# Patient Record
Sex: MALE | Race: WHITE | Employment: OTHER | URBAN - METROPOLITAN AREA
[De-identification: names, ages, dates, MRNs, and addresses within clinical notes are randomized per-mention and may not be internally consistent; named-entity substitution may affect disease eponyms.]

---

## 2019-01-02 ENCOUNTER — HOSPITAL ENCOUNTER (OUTPATIENT)
Age: 79
Discharge: HOME | End: 2019-01-04
Payer: MEDICARE

## 2019-01-02 PROCEDURE — 88304 TISSUE EXAM BY PATHOLOGIST: CPT

## 2019-11-25 ENCOUNTER — HOSPITAL ENCOUNTER (EMERGENCY)
Age: 79
Discharge: HOME | End: 2019-11-25
Attending: EMERGENCY MEDICINE
Payer: MEDICARE

## 2019-11-25 VITALS
SYSTOLIC BLOOD PRESSURE: 119 MMHG | WEIGHT: 175 LBS | BODY MASS INDEX: 27.47 KG/M2 | DIASTOLIC BLOOD PRESSURE: 63 MMHG | HEART RATE: 74 BPM | RESPIRATION RATE: 16 BRPM | HEIGHT: 67 IN | TEMPERATURE: 97.7 F | OXYGEN SATURATION: 97 %

## 2019-11-25 DIAGNOSIS — Z87.19 H/O IRRITABLE BOWEL SYNDROME: ICD-10-CM

## 2019-11-25 DIAGNOSIS — R10.30 LOWER ABDOMINAL PAIN: Primary | ICD-10-CM

## 2019-11-25 DIAGNOSIS — R20.0 NUMBNESS AND TINGLING IN LEFT ARM: ICD-10-CM

## 2019-11-25 DIAGNOSIS — R20.2 NUMBNESS AND TINGLING IN LEFT ARM: ICD-10-CM

## 2019-11-25 LAB
ALBUMIN SERPL-MCNC: 4.4 G/DL (ref 3.5–5.2)
ALP BLD-CCNC: 97 U/L (ref 40–129)
ALT SERPL-CCNC: 12 U/L (ref 0–40)
ANION GAP SERPL CALCULATED.3IONS-SCNC: 12 MMOL/L (ref 7–16)
AST SERPL-CCNC: 20 U/L (ref 0–39)
BILIRUB SERPL-MCNC: 0.4 MG/DL (ref 0–1.2)
BILIRUBIN URINE: NEGATIVE
BLOOD, URINE: NEGATIVE
BUN BLDV-MCNC: 18 MG/DL (ref 8–23)
CALCIUM SERPL-MCNC: 9.3 MG/DL (ref 8.6–10.2)
CHLORIDE BLD-SCNC: 100 MMOL/L (ref 98–107)
CLARITY: CLEAR
CO2: 26 MMOL/L (ref 22–29)
COLOR: YELLOW
CREAT SERPL-MCNC: 1.1 MG/DL (ref 0.7–1.2)
GFR AFRICAN AMERICAN: >60
GFR NON-AFRICAN AMERICAN: >60 ML/MIN/1.73
GLUCOSE BLD-MCNC: 94 MG/DL (ref 74–99)
GLUCOSE URINE: NEGATIVE MG/DL
HCT VFR BLD CALC: 46.8 % (ref 37–54)
HEMOGLOBIN: 15.1 G/DL (ref 12.5–16.5)
KETONES, URINE: NEGATIVE MG/DL
LACTIC ACID: 1.4 MMOL/L (ref 0.5–2.2)
LEUKOCYTE ESTERASE, URINE: NEGATIVE
MCH RBC QN AUTO: 29.1 PG (ref 26–35)
MCHC RBC AUTO-ENTMCNC: 32.3 % (ref 32–34.5)
MCV RBC AUTO: 90.2 FL (ref 80–99.9)
NITRITE, URINE: NEGATIVE
PDW BLD-RTO: 13.7 FL (ref 11.5–15)
PH UA: 6 (ref 5–9)
PLATELET # BLD: 333 E9/L (ref 130–450)
PMV BLD AUTO: 10.1 FL (ref 7–12)
POTASSIUM REFLEX MAGNESIUM: 3.7 MMOL/L (ref 3.5–5)
PROTEIN UA: NEGATIVE MG/DL
RBC # BLD: 5.19 E12/L (ref 3.8–5.8)
SODIUM BLD-SCNC: 138 MMOL/L (ref 132–146)
SPECIFIC GRAVITY UA: <=1.005 (ref 1–1.03)
TOTAL PROTEIN: 7.2 G/DL (ref 6.4–8.3)
TROPONIN: <0.01 NG/ML (ref 0–0.03)
UROBILINOGEN, URINE: 0.2 E.U./DL
WBC # BLD: 11.4 E9/L (ref 4.5–11.5)

## 2019-11-25 PROCEDURE — 80053 COMPREHEN METABOLIC PANEL: CPT

## 2019-11-25 PROCEDURE — 81003 URINALYSIS AUTO W/O SCOPE: CPT

## 2019-11-25 PROCEDURE — 99284 EMERGENCY DEPT VISIT MOD MDM: CPT

## 2019-11-25 PROCEDURE — 83605 ASSAY OF LACTIC ACID: CPT

## 2019-11-25 PROCEDURE — 85027 COMPLETE CBC AUTOMATED: CPT

## 2019-11-25 PROCEDURE — 93005 ELECTROCARDIOGRAM TRACING: CPT | Performed by: EMERGENCY MEDICINE

## 2019-11-25 PROCEDURE — 36415 COLL VENOUS BLD VENIPUNCTURE: CPT

## 2019-11-25 PROCEDURE — 84484 ASSAY OF TROPONIN QUANT: CPT

## 2019-11-25 ASSESSMENT — ENCOUNTER SYMPTOMS
ABDOMINAL PAIN: 1
COUGH: 0
EYE REDNESS: 0
NAUSEA: 0
EYE PAIN: 0
VOMITING: 0
SINUS PRESSURE: 0
DIARRHEA: 1
WHEEZING: 0
SHORTNESS OF BREATH: 0
SORE THROAT: 0
EYE DISCHARGE: 0
BACK PAIN: 0

## 2019-11-25 ASSESSMENT — PAIN SCALES - GENERAL: PAINLEVEL_OUTOF10: 2

## 2019-11-25 ASSESSMENT — PAIN DESCRIPTION - PAIN TYPE: TYPE: ACUTE PAIN

## 2019-11-25 ASSESSMENT — PAIN DESCRIPTION - LOCATION: LOCATION: ABDOMEN

## 2019-11-26 LAB
EKG ATRIAL RATE: 75 BPM
EKG P AXIS: 55 DEGREES
EKG P-R INTERVAL: 134 MS
EKG Q-T INTERVAL: 430 MS
EKG QRS DURATION: 140 MS
EKG QTC CALCULATION (BAZETT): 480 MS
EKG R AXIS: 90 DEGREES
EKG T AXIS: 8 DEGREES
EKG VENTRICULAR RATE: 75 BPM

## 2021-01-31 ENCOUNTER — IMMUNIZATION (OUTPATIENT)
Dept: PRIMARY CARE CLINIC | Age: 81
End: 2021-01-31
Payer: MEDICARE

## 2021-01-31 PROCEDURE — 91300 COVID-19, PFIZER VACCINE 30MCG/0.3ML DOSE: CPT | Performed by: PHYSICIAN ASSISTANT

## 2021-01-31 PROCEDURE — 0001A COVID-19, PFIZER VACCINE 30MCG/0.3ML DOSE: CPT | Performed by: PHYSICIAN ASSISTANT

## 2021-02-25 ENCOUNTER — IMMUNIZATION (OUTPATIENT)
Dept: PRIMARY CARE CLINIC | Age: 81
End: 2021-02-25
Payer: MEDICARE

## 2021-02-25 PROCEDURE — 91300 COVID-19, PFIZER VACCINE 30MCG/0.3ML DOSE: CPT | Performed by: PHYSICIAN ASSISTANT

## 2021-02-25 PROCEDURE — 0002A COVID-19, PFIZER VACCINE 30MCG/0.3ML DOSE: CPT | Performed by: PHYSICIAN ASSISTANT

## 2021-02-26 ENCOUNTER — HOSPITAL ENCOUNTER (OUTPATIENT)
Age: 81
Discharge: HOME | End: 2021-02-28

## 2021-02-26 PROCEDURE — 87081 CULTURE SCREEN ONLY: CPT

## 2021-02-26 PROCEDURE — 88305 TISSUE EXAM BY PATHOLOGIST: CPT

## 2021-02-27 LAB — CLOTEST: NORMAL

## 2022-01-19 ENCOUNTER — HOSPITAL ENCOUNTER (OUTPATIENT)
Age: 82
Discharge: HOME | End: 2022-01-21

## 2022-01-19 PROCEDURE — 87081 CULTURE SCREEN ONLY: CPT

## 2022-01-19 PROCEDURE — 88305 TISSUE EXAM BY PATHOLOGIST: CPT

## 2022-01-20 LAB — CLOTEST: NORMAL

## 2022-02-23 ENCOUNTER — HOSPITAL ENCOUNTER (OUTPATIENT)
Dept: CT IMAGING | Age: 82
Discharge: HOME | End: 2022-02-25
Payer: MEDICARE

## 2022-02-23 ENCOUNTER — ANESTHESIA EVENT (OUTPATIENT)
Dept: CT IMAGING | Age: 82
End: 2022-02-23

## 2022-02-23 ENCOUNTER — ANESTHESIA (OUTPATIENT)
Dept: CT IMAGING | Age: 82
End: 2022-02-23

## 2022-02-23 VITALS
BODY MASS INDEX: 25.9 KG/M2 | HEART RATE: 68 BPM | WEIGHT: 165 LBS | DIASTOLIC BLOOD PRESSURE: 78 MMHG | TEMPERATURE: 98.1 F | HEIGHT: 67 IN | OXYGEN SATURATION: 98 % | SYSTOLIC BLOOD PRESSURE: 129 MMHG | RESPIRATION RATE: 20 BRPM

## 2022-02-23 VITALS
SYSTOLIC BLOOD PRESSURE: 100 MMHG | RESPIRATION RATE: 4 BRPM | OXYGEN SATURATION: 98 % | DIASTOLIC BLOOD PRESSURE: 51 MMHG

## 2022-02-23 DIAGNOSIS — R59.1 LYMPHADENOPATHY: ICD-10-CM

## 2022-02-23 LAB
ANION GAP SERPL CALCULATED.3IONS-SCNC: 11 MMOL/L (ref 7–16)
BASOPHILS ABSOLUTE: 0.1 E9/L (ref 0–0.2)
BASOPHILS RELATIVE PERCENT: 1.3 % (ref 0–2)
BUN BLDV-MCNC: 17 MG/DL (ref 6–23)
CALCIUM SERPL-MCNC: 9.4 MG/DL (ref 8.6–10.2)
CHLORIDE BLD-SCNC: 104 MMOL/L (ref 98–107)
CO2: 25 MMOL/L (ref 22–29)
CREAT SERPL-MCNC: 0.9 MG/DL (ref 0.7–1.2)
EOSINOPHILS ABSOLUTE: 0.19 E9/L (ref 0.05–0.5)
EOSINOPHILS RELATIVE PERCENT: 2.4 % (ref 0–6)
GFR AFRICAN AMERICAN: >60
GFR NON-AFRICAN AMERICAN: >60 ML/MIN/1.73
GLUCOSE BLD-MCNC: 93 MG/DL (ref 74–99)
HCT VFR BLD CALC: 50.1 % (ref 37–54)
HEMOGLOBIN: 16.8 G/DL (ref 12.5–16.5)
IMMATURE GRANULOCYTES #: 0.01 E9/L
IMMATURE GRANULOCYTES %: 0.1 % (ref 0–5)
INR BLD: 1.2
LYMPHOCYTES ABSOLUTE: 1.59 E9/L (ref 1.5–4)
LYMPHOCYTES RELATIVE PERCENT: 20.2 % (ref 20–42)
MCH RBC QN AUTO: 31.2 PG (ref 26–35)
MCHC RBC AUTO-ENTMCNC: 33.5 % (ref 32–34.5)
MCV RBC AUTO: 92.9 FL (ref 80–99.9)
MONOCYTES ABSOLUTE: 0.58 E9/L (ref 0.1–0.95)
MONOCYTES RELATIVE PERCENT: 7.4 % (ref 2–12)
NEUTROPHILS ABSOLUTE: 5.4 E9/L (ref 1.8–7.3)
NEUTROPHILS RELATIVE PERCENT: 68.6 % (ref 43–80)
PDW BLD-RTO: 13 FL (ref 11.5–15)
PLATELET # BLD: 272 E9/L (ref 130–450)
PMV BLD AUTO: 9.9 FL (ref 7–12)
POTASSIUM SERPL-SCNC: 3.9 MMOL/L (ref 3.5–5)
PROTHROMBIN TIME: 12.7 SEC (ref 9.3–12.4)
RBC # BLD: 5.39 E12/L (ref 3.8–5.8)
SODIUM BLD-SCNC: 140 MMOL/L (ref 132–146)
WBC # BLD: 7.9 E9/L (ref 4.5–11.5)

## 2022-02-23 PROCEDURE — 3700000000 HC ANESTHESIA ATTENDED CARE

## 2022-02-23 PROCEDURE — 6360000004 HC RX CONTRAST MEDICATION: Performed by: RADIOLOGY

## 2022-02-23 PROCEDURE — 49180 BIOPSY ABDOMINAL MASS: CPT | Performed by: RADIOLOGY

## 2022-02-23 PROCEDURE — 85025 COMPLETE CBC W/AUTO DIFF WBC: CPT

## 2022-02-23 PROCEDURE — 88184 FLOWCYTOMETRY/ TC 1 MARKER: CPT

## 2022-02-23 PROCEDURE — 2709999900 CT GUIDED NEEDLE PLACEMENT

## 2022-02-23 PROCEDURE — 88342 IMHCHEM/IMCYTCHM 1ST ANTB: CPT

## 2022-02-23 PROCEDURE — 88360 TUMOR IMMUNOHISTOCHEM/MANUAL: CPT

## 2022-02-23 PROCEDURE — 36415 COLL VENOUS BLD VENIPUNCTURE: CPT

## 2022-02-23 PROCEDURE — 3700000001 HC ADD 15 MINUTES (ANESTHESIA)

## 2022-02-23 PROCEDURE — 88185 FLOWCYTOMETRY/TC ADD-ON: CPT

## 2022-02-23 PROCEDURE — 85610 PROTHROMBIN TIME: CPT

## 2022-02-23 PROCEDURE — 2580000003 HC RX 258

## 2022-02-23 PROCEDURE — 77012 CT SCAN FOR NEEDLE BIOPSY: CPT | Performed by: RADIOLOGY

## 2022-02-23 PROCEDURE — 36591 DRAW BLOOD OFF VENOUS DEVICE: CPT

## 2022-02-23 PROCEDURE — 88341 IMHCHEM/IMCYTCHM EA ADD ANTB: CPT

## 2022-02-23 PROCEDURE — 7100000010 HC PHASE II RECOVERY - FIRST 15 MIN

## 2022-02-23 PROCEDURE — 88305 TISSUE EXAM BY PATHOLOGIST: CPT

## 2022-02-23 PROCEDURE — 7100000011 HC PHASE II RECOVERY - ADDTL 15 MIN

## 2022-02-23 PROCEDURE — 80048 BASIC METABOLIC PNL TOTAL CA: CPT

## 2022-02-23 PROCEDURE — 2500000003 HC RX 250 WO HCPCS: Performed by: RADIOLOGY

## 2022-02-23 PROCEDURE — 6360000002 HC RX W HCPCS

## 2022-02-23 PROCEDURE — 49180 BIOPSY ABDOMINAL MASS: CPT

## 2022-02-23 RX ORDER — SODIUM CHLORIDE 9 MG/ML
INJECTION, SOLUTION INTRAVENOUS CONTINUOUS PRN
Status: DISCONTINUED | OUTPATIENT
Start: 2022-02-23 | End: 2022-02-23 | Stop reason: SDUPTHER

## 2022-02-23 RX ORDER — PROPOFOL 10 MG/ML
INJECTION, EMULSION INTRAVENOUS CONTINUOUS PRN
Status: DISCONTINUED | OUTPATIENT
Start: 2022-02-23 | End: 2022-02-23 | Stop reason: SDUPTHER

## 2022-02-23 RX ORDER — FENTANYL CITRATE 50 UG/ML
INJECTION, SOLUTION INTRAMUSCULAR; INTRAVENOUS PRN
Status: DISCONTINUED | OUTPATIENT
Start: 2022-02-23 | End: 2022-02-23 | Stop reason: SDUPTHER

## 2022-02-23 RX ORDER — LIDOCAINE HYDROCHLORIDE 20 MG/ML
INJECTION, SOLUTION INFILTRATION; PERINEURAL
Status: COMPLETED | OUTPATIENT
Start: 2022-02-23 | End: 2022-02-23

## 2022-02-23 RX ADMIN — SODIUM CHLORIDE: 9 INJECTION, SOLUTION INTRAVENOUS at 13:27

## 2022-02-23 RX ADMIN — LIDOCAINE HYDROCHLORIDE 5 ML: 20 INJECTION, SOLUTION INFILTRATION; PERINEURAL at 13:55

## 2022-02-23 RX ADMIN — IOPAMIDOL 100 ML: 755 INJECTION, SOLUTION INTRAVENOUS at 14:04

## 2022-02-23 RX ADMIN — IOPAMIDOL 1 ML: 612 INJECTION, SOLUTION INTRAVENOUS at 14:04

## 2022-02-23 RX ADMIN — PROPOFOL 50 MCG/KG/MIN: 10 INJECTION, EMULSION INTRAVENOUS at 13:44

## 2022-02-23 RX ADMIN — FENTANYL CITRATE 100 MCG: 50 INJECTION, SOLUTION INTRAMUSCULAR; INTRAVENOUS at 13:44

## 2022-02-23 NOTE — ANESTHESIA POSTPROCEDURE EVALUATION
Department of Anesthesiology  Postprocedure Note    Patient: Kati Hoyos  MRN: [id number]  [place]: 1940  Date of evaluation: 2/23/2022  Time:  4:41 PM     Procedure Summary     Date: 02/23/22 Room / Location: [address] Scan; [address] [place] [address] Procedures; [address] Radiology    Anesthesia Start: 6583 Anesthesia Stop:     Procedures:       CT GUIDED NEEDLE PLACEMENT      CT BIOPSY ABDOMEN RETROPERITONEUM Diagnosis:       Lymphadenopathy      Generalized enlarged lymph nodes      (Lymphadenopathy)      (Lymphadenopathy)    Scheduled Providers: [facility] General Radiologist Responsible Provider: Lakisha Sam MD    Anesthesia Type: MAC ASA Status: 3          Anesthesia Type: MAC    Gus Phase I:      Gus Phase II:      Last vitals: Reviewed and per EMR flowsheets. Anesthesia Post Evaluation    Patient location: IR Recovery.   Patient participation: complete - patient participated  Level of consciousness: awake  Pain score: 0  Airway patency: patent  Nausea & Vomiting: no nausea and no vomiting  Complications: no  Cardiovascular status: blood pressure returned to baseline and hemodynamically stable  Respiratory status: acceptable  Hydration status: euvolemic

## 2022-02-23 NOTE — H&P
Interventional Radiology  Attending Pre-operative History and Physical    DIAGNOSIS:    Patient Active Problem List   Diagnosis    Hypertension    GERD (gastroesophageal reflux disease)       CHIEF COMPLAINT: <principal problem not specified>          Current Outpatient Medications:     Probiotic Product (HEALTHY COLON PO), Take  by mouth daily. , Disp: , Rfl:     lisinopril (PRINIVIL;ZESTRIL) 10 MG tablet, Take 10 mg by mouth daily. , Disp: , Rfl:     loperamide (IMODIUM A-D) 2 MG tablet, Take 2 mg by mouth 2 times daily. , Disp: , Rfl:     ipratropium (ATROVENT) 0.06 % nasal spray, 2 sprays by Nasal route daily. , Disp: , Rfl:     omeprazole (PRILOSEC) 20 MG capsule, Take 40 mg by mouth daily. , Disp: , Rfl:     meloxicam (MOBIC) 15 MG tablet, Take 15 mg by mouth daily. , Disp: , Rfl:   No current facility-administered medications for this encounter.     Facility-Administered Medications Ordered in Other Encounters:     0.9 % sodium chloride infusion, , IntraVENous, Continuous PRN, MARQUISE Herrera - CRNA, New Bag at 02/23/22 1327    Allergies   Allergen Reactions    Augmentin [Amoxicillin-Pot Clavulanate]     Ceftin [Cefuroxime]     Erythromycin Base        Past Medical History:   Diagnosis Date    Back pain     disc disease    GERD (gastroesophageal reflux disease)     Hypertension     IBS (irritable bowel syndrome)     Osteoarthrosis     Raynaud's disease     Vasomotor rhinitis        Past Surgical History:   Procedure Laterality Date    NASAL SEPTUM SURGERY      ROTATOR CUFF REPAIR Right 1988    WISDOM TOOTH EXTRACTION         Family History   Problem Relation Age of Onset    Dementia Mother     Heart Attack Father     Cancer Sister         colon       Social History     Socioeconomic History    Marital status:      Spouse name: Not on file    Number of children: Not on file    Years of education: Not on file    Highest education level: Not on file   Occupational History    Not on file   Tobacco Use    Smoking status: Former Smoker     Packs/day: 1.00     Years: 45.00     Pack years: 45.00     Types: Cigarettes     Quit date: 1/1/2012     Years since quitting: 10.1    Smokeless tobacco: Never Used   Substance and Sexual Activity    Alcohol use: Yes     Alcohol/week: 1.0 standard drink     Types: 1 Cans of beer per week    Drug use: No    Sexual activity: Not on file   Other Topics Concern    Not on file   Social History Narrative    Not on file     Social Determinants of Health     Financial Resource Strain:     Difficulty of Paying Living Expenses: Not on file   Food Insecurity:     Worried About Running Out of Food in the Last Year: Not on file    Christian of Food in the Last Year: Not on file   Transportation Needs:     Lack of Transportation (Medical): Not on file    Lack of Transportation (Non-Medical):  Not on file   Physical Activity:     Days of Exercise per Week: Not on file    Minutes of Exercise per Session: Not on file   Stress:     Feeling of Stress : Not on file   Social Connections:     Frequency of Communication with Friends and Family: Not on file    Frequency of Social Gatherings with Friends and Family: Not on file    Attends Restoration Services: Not on file    Active Member of [address] [place] [address] [employer] or Organizations: Not on file    Attends Club or Organization Meetings: Not on file    Marital Status: Not on file   Intimate Partner Violence:     Fear of Current or Ex-Partner: Not on file    Emotionally Abused: Not on file    Physically Abused: Not on file    Sexually Abused: Not on file   Housing Stability:     Unable to Pay for Housing in the Last Year: Not on file    Number of [place] in the Last Year: Not on file    Unstable Housing in the Last Year: Not on file       ROS: Non-contributory other than as noted above    PHYSICAL EXAM:      Heart and Lungs:  demonstrate no contraindications to proceed    DATA:  CBC:   Lab Results   Component Value Date WBC 7.9 02/23/2022    RBC 5.39 02/23/2022    HGB 16.8 02/23/2022    HCT 50.1 02/23/2022    MCV 92.9 02/23/2022    MCH 31.2 02/23/2022    MCHC 33.5 02/23/2022    RDW 13.0 02/23/2022     02/23/2022    MPV 9.9 02/23/2022     CBC with Differential:    Lab Results   Component Value Date    WBC 7.9 02/23/2022    RBC 5.39 02/23/2022    HGB 16.8 02/23/2022    HCT 50.1 02/23/2022     02/23/2022    MCV 92.9 02/23/2022    MCH 31.2 02/23/2022    MCHC 33.5 02/23/2022    RDW 13.0 02/23/2022    LYMPHOPCT 20.2 02/23/2022    MONOPCT 7.4 02/23/2022    BASOPCT 1.3 02/23/2022    MONOSABS 0.58 02/23/2022    LYMPHSABS 1.59 02/23/2022    EOSABS 0.19 02/23/2022    BASOSABS 0.10 02/23/2022     Platelets:    Lab Results   Component Value Date     02/23/2022     BUN/Creatinine:    Lab Results   Component Value Date    BUN 18 11/25/2019    CREATININE 1.1 11/25/2019       ASSESSMENT AND PLAN:  1. Right iliac chain mass plan bx  2. Procedure options, risks and benefits reviewed with patient. Patient expresses understanding.     Electronically signed by Beau Ventura MD on 2/23/2022 at 1:29 PM

## 2022-02-23 NOTE — BRIEF OP NOTE
Brief Postoperative Note    Lilia Morgan  YOB: 1940  [id number]    Pre-operative Diagnosis: retroperitoneal mass plan bx  Right iliac chain mass plan bx  Post-operative Diagnosis: Same    Procedure: biopsy    Estimated Blood Loss: < 10 cc    Surgeon: Amparo GILLESPIE     Complications: none    Specimens obtained: Yes, biopsy of mass    Findings: biopsy of a mass      Meenakshi Jang II, MD   2/23/2022 1:29 PM

## 2022-02-23 NOTE — ANESTHESIA PRE PROCEDURE
Department of Anesthesiology  Preprocedure Note       Name:  Liliya Vazquez   Age:  80 y.o.  :  1940                                          MRN:  [id number]         Date:  2022      Surgeon: * Surgery not found *    Procedure:     Medications prior to admission:   Prior to Admission medications    Medication Sig Start Date End Date Taking? Authorizing Provider   Probiotic Product (HEALTHY COLON PO) Take  by mouth daily. Yes Historical Provider, MD   lisinopril (PRINIVIL;ZESTRIL) 10 MG tablet Take 10 mg by mouth daily. Yes Historical Provider, MD   loperamide (IMODIUM A-D) 2 MG tablet Take 2 mg by mouth 2 times daily. Yes Historical Provider, MD   ipratropium (ATROVENT) 0.06 % nasal spray 2 sprays by Nasal route daily. Yes Historical Provider, MD   omeprazole (PRILOSEC) 20 MG capsule Take 40 mg by mouth daily. Yes Historical Provider, MD   meloxicam (MOBIC) 15 MG tablet Take 15 mg by mouth daily. Historical Provider, MD       Current medications:    Current Outpatient Medications   Medication Sig Dispense Refill    Probiotic Product (HEALTHY COLON PO) Take  by mouth daily.  lisinopril (PRINIVIL;ZESTRIL) 10 MG tablet Take 10 mg by mouth daily.  loperamide (IMODIUM A-D) 2 MG tablet Take 2 mg by mouth 2 times daily.  ipratropium (ATROVENT) 0.06 % nasal spray 2 sprays by Nasal route daily.  omeprazole (PRILOSEC) 20 MG capsule Take 40 mg by mouth daily.  meloxicam (MOBIC) 15 MG tablet Take 15 mg by mouth daily. No current facility-administered medications for this encounter. Allergies:     Allergies   Allergen Reactions    Augmentin [Amoxicillin-Pot Clavulanate]     Ceftin [Cefuroxime]     Erythromycin Base        Problem List:    Patient Active Problem List   Diagnosis Code    Hypertension I10    GERD (gastroesophageal reflux disease) K21.9       Past Medical History:        Diagnosis Date    Back pain     disc disease    GERD (gastroesophageal reflux disease)     Hypertension     IBS (irritable bowel syndrome)     Osteoarthrosis     Raynaud's disease     Vasomotor rhinitis        Past Surgical History:        Procedure Laterality Date    NASAL SEPTUM SURGERY      ROTATOR CUFF REPAIR Right 1988    WISDOM TOOTH EXTRACTION         Social History:    Social History     Tobacco Use    Smoking status: Former Smoker     Packs/day: 1.00     Years: 45.00     Pack years: 45.00     Types: Cigarettes     Quit date: 1/1/2012     Years since quitting: 10.1    Smokeless tobacco: Never Used   Substance Use Topics    Alcohol use: Yes     Alcohol/week: 1.0 standard drink     Types: 1 Cans of beer per week                                Counseling given: Not Answered      Vital Signs (Current):   Vitals:    02/23/22 1254 02/23/22 1306 02/23/22 1310   BP: (!) 179/94 (!) 152/81 124/74   Pulse: 67  100   Resp: 18  16   Temp: 36.7 °C (98.1 °F)     TempSrc: Temporal     SpO2: 96%  93%   Weight: 165 lb (74.8 kg)     Height: 5' 7\" (1.702 m)                                                BP Readings from Last 3 Encounters:   02/23/22 124/74   11/25/19 119/63   07/31/14 156/82       NPO Status: Time of last liquid consumption: 2200                        Time of last solid consumption: 2200                        Date of last liquid consumption: 02/22/22                        Date of last solid food consumption: 02/22/22    BMI:   Wt Readings from Last 3 Encounters:   02/23/22 165 lb (74.8 kg)   11/25/19 175 lb (79.4 kg)   07/31/14 185 lb 4.8 oz (84.1 kg)     Body mass index is 25.84 kg/m².     CBC:   Lab Results   Component Value Date    WBC 7.9 02/23/2022    RBC 5.39 02/23/2022    HGB 16.8 02/23/2022    HCT 50.1 02/23/2022    MCV 92.9 02/23/2022    RDW 13.0 02/23/2022     02/23/2022       CMP:   Lab Results   Component Value Date     11/25/2019    K 3.7 11/25/2019     11/25/2019    CO2 26 11/25/2019    BUN 18 11/25/2019    CREATININE 1.1 11/25/2019    GFRAA >60 11/25/2019    LABGLOM >60 11/25/2019    GLUCOSE 94 11/25/2019    PROT 7.2 11/25/2019    CALCIUM 9.3 11/25/2019    BILITOT 0.4 11/25/2019    ALKPHOS 97 11/25/2019    AST 20 11/25/2019    ALT 12 11/25/2019       POC Tests: No results for input(s): POCGLU, POCNA, POCK, POCCL, POCBUN, POCHEMO, POCHCT in the last 72 hours. Coags:   Lab Results   Component Value Date    PROTIME 12.7 02/23/2022    INR 1.2 02/23/2022       HCG (If Applicable): No results found for: PREGTESTUR, PREGSERUM, HCG, HCGQUANT     ABGs: No results found for: PHART, PO2ART, CFT5TST, BLC4KSQ, BEART, P4HDAGNC     Type & Screen (If Applicable):  No results found for: LABABO, LABRH    Drug/Infectious Status (If Applicable):  No results found for: HIV, HEPCAB    COVID-19 Screening (If Applicable): No results found for: COVID19        Anesthesia Evaluation  Patient summary reviewed and Nursing notes reviewed no history of anesthetic complications:   Airway: Mallampati: I  TM distance: >3 FB   Neck ROM: full  Mouth opening: > = 3 FB Dental: normal exam         Pulmonary:Negative Pulmonary ROS breath sounds clear to auscultation                             Cardiovascular:    (+) hypertension:,       ECG reviewed  Rhythm: regular  Rate: normal           Beta Blocker:  Not on Beta Blocker         Neuro/Psych:   Negative Neuro/Psych ROS              GI/Hepatic/Renal:   (+) GERD:,           Endo/Other: Negative Endo/Other ROS             Pt had no PAT visit       Abdominal:             Vascular: negative vascular ROS. Other Findings:             Anesthesia Plan      MAC     ASA 3       Induction: intravenous. Anesthetic plan and risks discussed with patient. Use of blood products discussed with patient whom consented to blood products. Plan discussed with attending. MARQUISE Delarosa - CRNA   2/23/2022        Pt seen, examined, chart reviewed, plan discussed.   Levar Perez MD  2/23/2022  4:39 PM

## 2022-02-23 NOTE — PROGRESS NOTES
Patient arrived with wife to Radiology department for right iliac. Allergies, home medications, H&P and fasting instructions reviewed with patient. Vital signs taken. IV placed, blood obtained, IV flushed and prn adapter attached. Blood sample sent to lab for ordered tests. Procedural instructions given, questions answered, understanding expressed and consent signed. Patient given fluoroscopy education, no questions at this time.

## 2022-02-28 LAB
Lab: NORMAL
REPORT: NORMAL
THIS TEST SENT TO: NORMAL

## 2022-04-11 PROBLEM — K58.9 IRRITABLE BOWEL SYNDROME: Status: ACTIVE | Noted: 2022-04-11

## 2022-04-11 PROBLEM — E44.0 MALNUTRITION OF MODERATE DEGREE (HCC): Status: ACTIVE | Noted: 2021-05-13

## 2022-04-11 PROBLEM — D64.9 CHRONIC ANEMIA: Status: ACTIVE | Noted: 2022-04-11

## 2022-04-12 ENCOUNTER — HOSPITAL ENCOUNTER (EMERGENCY)
Age: 82
Discharge: LEFT BEFORE DISCHARGE | End: 2022-04-12
Payer: MEDICARE

## 2022-04-12 VITALS
WEIGHT: 167 LBS | OXYGEN SATURATION: 98 % | HEIGHT: 68 IN | SYSTOLIC BLOOD PRESSURE: 193 MMHG | BODY MASS INDEX: 25.31 KG/M2 | HEART RATE: 96 BPM | DIASTOLIC BLOOD PRESSURE: 91 MMHG | RESPIRATION RATE: 16 BRPM | TEMPERATURE: 97.3 F

## 2022-04-12 DIAGNOSIS — Z53.21 ELOPED FROM EMERGENCY DEPARTMENT: Primary | ICD-10-CM

## 2022-04-12 PROCEDURE — 99283 EMERGENCY DEPT VISIT LOW MDM: CPT

## 2022-04-12 PROCEDURE — 99282 EMERGENCY DEPT VISIT SF MDM: CPT

## 2022-04-13 NOTE — ED NOTES
Department of Emergency Medicine  FIRST PROVIDER TRIAGE NOTE             Independent MLP           4/12/22  9:40 PM EDT    Date of Encounter: 4/12/22   MRN: [id number]      HPI: Jeri Garcia is a 80 y.o. male who presents to the ED for Urinary Retention (sent in by Dr. Sonny Clinton from [address] [place] [address] for catheter placement )     ROS: Negative for fever or cough. PE: Gen Appearance/Constitutional: alert     Initial Plan of Care: All treatment areas with department are currently occupied. Plan to order/Initiate the following while awaiting opening in ED: labs.   Initiate Treatment-Testing, Proceed toTreatment Area When Bed Available for ED Attending/MLP to Continue Care    Electronically signed by Ursula Hernandez PA-C   DD: 4/12/22         Ursula Hernandez PA-C  04/12/22 7839

## 2022-04-13 NOTE — ED PROVIDER NOTES
[facility]     Department of Emergency Medicine   ED  Encounter Note  Admit Date/RoomTime: 2022 11:02 PM  ED Room: NARDA/NARDA    NAME: Edith Sands  : 1940  MRN: [id number]     Chief Complaint:  Urinary Retention (sent in by Dr. Elmer Clark from [address] [place] [address] for catheter placement )    History of Present Illness       Edith Sands is a 80 y.o. old male who presents to the emergency department by private vehicle, for urinary retention, which occured a few hour(s) prior to arrival.  Since onset the symptoms have been remaining constant and mild in severity. Symptoms are associated with nothing additional as it pertains to encounter. He denies any abdominal pain, back pain, fever, chills, sweating, cloudy urine, nausea, vomiting, constipation, diarrhea, dysuria or headache. ROS   Pertinent positives and negatives are stated within HPI, all other systems reviewed and are negative. Past Medical History:  has a past medical history of Back pain, GERD (gastroesophageal reflux disease), Hypertension, IBS (irritable bowel syndrome), Osteoarthrosis, Raynaud's disease, and Vasomotor rhinitis. Surgical History:  has a past surgical history that includes Rotator cuff repair (Right, ); Nasal septum surgery; [place] tooth extraction; and CT BIOPSY ABDOMEN RETROPERITONEUM (2022). Social History:  reports that he quit smoking about 14 years ago. His smoking use included cigarettes. He started smoking about 57 years ago. He has a 32.25 pack-year smoking history. He has never used smokeless tobacco. He reports current alcohol use of about 1.0 standard drink of alcohol per week. He reports that he does not use drugs. Family History: family history includes Cancer in his sister; Dementia in his mother; Heart Attack in his father.      Allergies: Amoxicillin-pot clavulanate, Ceftin [cefuroxime], Cefuroxime axetil, and Erythromycin base    Physical Exam   Oxygen Saturation Interpretation: Normal. transcribed using voice recognition software. Every effort was made to ensure accuracy; however, inadvertent computerized transcription errors may be present.   END OF ED PROVIDER NOTE            Saundra Townsend PA-C  04/12/22 5843

## 2022-04-13 NOTE — ED NOTES
Pt reports went into bathroom and voided a large amount of urine without difficulty. Denies pain or any difficulty with urination. States he no longer feels he has any issue with urinating and feels he is satisfactory condition to go home. Pt understands he may come back at any time if he experiences any further difficulty.      Yisel Novak RN  04/12/22 4531

## 2023-05-22 ENCOUNTER — HOSPITAL ENCOUNTER (OUTPATIENT)
Dept: NEUROLOGY | Age: 83
Discharge: HOME | End: 2023-05-22
Payer: MEDICARE

## 2023-05-22 VITALS — HEIGHT: 67 IN | WEIGHT: 168 LBS | BODY MASS INDEX: 26.37 KG/M2

## 2023-05-22 PROCEDURE — 95911 NRV CNDJ TEST 9-10 STUDIES: CPT

## 2023-05-22 PROCEDURE — 95886 MUSC TEST DONE W/N TEST COMP: CPT

## 2023-05-22 NOTE — PROCEDURES
[facility] [address] [address]   Electrodiagnostic Laboratory  [place]        Full Name: Olivia Husain Gender: Male  MRN: [id number] YOB: 1940  Location[de-identified] Outpt. Visit Date: 5/22/2023 12:32  Age: [de-identified] Years 8 Months Old  Examining Physician: Dr. Mian Bueno  Referring Physician: Dr. Diamond Drivers  Technician: Boby Francois   Height: 5 feet 7 inch  Weight: 168 lbs  Notes: Neuropathy, Radiculopathy M54.1      Motor NCS      Nerve / Sites Lat. Amplitude Distance Lat Diff Velocity Temp. Amp. 1-2    ms mV cm ms m/s °C %   L Peroneal - EDB      Ankle 4.22 3.1 8   32 100      Pop fossa 13.65 2.8 38 9.43 40 32 92.8   R Peroneal - EDB      Ankle 5.36 1.0 8   32 100      Pop fossa 14.43 0.9 38 9.06 42 32 96.7   R Tibial - AH      Ankle 4.69 10.5 8   31.1 100      Pop fossa 15.68 9.8 42 10.99 38 31.2 93.4   L Tibial - AH      Ankle 4.43 8.1 8   31 100      Pop fossa 13.91 6.4 42 9.48 44 31.1 79       Sensory NCS      Nerve / Sites Onset Lat Peak Lat PP Amp Distance Velocity Temp.    ms ms µV cm m/s °C   L Superficial peroneal - Ankle      Lat leg 3.44 4.32 5.6 10 29 31.9   R Superficial peroneal - Ankle      Lat leg 2.50 3.07 6.6 10 40 31.9   R Sural - Ankle (Calf)      Calf 3.65 4.53 11.8 14 38 31.2   L Sural - Ankle (Calf)      Calf 4.11 4.74 7.3 14 34 31.1                 F  Wave      Nerve F Lat M Lat F-M Lat    ms ms ms   L Peroneal - EDB 57.6 5.6 52.0   R Peroneal - EDB NR NR NR   R Tibial - AH 59.6 6.5 53.2   L Tibial - AH 62.3 5.4 57.0       H Reflex      Nerve Lat Hmax    ms   R Tibial - [place] 32.6   L Tibial - [place]ole[place] 33.8       EMG         EMG Summary Table     Spontaneous MUAP Recruitment   Muscle IA Fib PSW Fasc H.F. Amp Dur. PPP Pattern   L. Extensor hallucis longus N None None None 2+Serrated N 1+ 2+ N   R. Extensor hallucis longus N None None None 1+Serrated N 1+ 1+ N   L. Flexor digitorum longus N None None None None N N 1+ N   R.  Flexor digitorum longus N